# Patient Record
Sex: MALE | Race: WHITE | NOT HISPANIC OR LATINO | Employment: FULL TIME | URBAN - NONMETROPOLITAN AREA
[De-identification: names, ages, dates, MRNs, and addresses within clinical notes are randomized per-mention and may not be internally consistent; named-entity substitution may affect disease eponyms.]

---

## 2022-09-13 ENCOUNTER — HOSPITAL ENCOUNTER (EMERGENCY)
Facility: HOSPITAL | Age: 27
Discharge: HOME | End: 2022-09-13
Attending: EMERGENCY MEDICINE

## 2022-09-13 VITALS
WEIGHT: 155.75 LBS | SYSTOLIC BLOOD PRESSURE: 139 MMHG | OXYGEN SATURATION: 99 % | RESPIRATION RATE: 20 BRPM | HEART RATE: 113 BPM | TEMPERATURE: 99.1 F | DIASTOLIC BLOOD PRESSURE: 96 MMHG

## 2022-09-13 DIAGNOSIS — F41.9 ANXIETY: Primary | ICD-10-CM

## 2022-09-13 PROCEDURE — 99282 EMERGENCY DEPT VISIT SF MDM: CPT

## 2022-09-13 PROCEDURE — 99282 EMERGENCY DEPT VISIT SF MDM: CPT | Performed by: EMERGENCY MEDICINE

## 2022-09-13 RX ORDER — OLANZAPINE 5 MG/1
10 TABLET, ORALLY DISINTEGRATING ORAL ONCE
Status: COMPLETED | OUTPATIENT
Start: 2022-09-13 | End: 2022-09-13

## 2022-09-13 RX ADMIN — OLANZAPINE 10 MG: 5 TABLET, ORALLY DISINTEGRATING ORAL at 16:11

## 2022-09-13 NOTE — ED NOTES
Crisis reached out to talk/assess the pt per Dr Silva request  Crisis was informed pt was no longer in the room, pt possibly left ED

## 2022-09-13 NOTE — ED PROVIDER NOTES
History  Chief Complaint   Patient presents with    Mouth Lesions     Patient reports that he smoked weed yesterday that may have been laced  States that he has a dry mouth and mouth sore as well as feels "out of it"     Patient is presenting to the emergency department for evaluation  Patient presents markedly anxious and states he is here for evaluation of mouth lesions  Patient history is disconjointed and pressured  He begins by relaying a story about how his brother recreationally uses meth and "hangs around with some scary people " He subsequently relays that he has concern that his brother is incrementally poisoning his mother  When asked what brought him here today he proceeds to relay that his former partner left with his child and he was unable to hold the baby  He states he is very anxious and is requiring help, commenting "I just need to check myself into a psychiatric hospital " When told he can be admitted for those reasons he states "not here " He then began requesting bloodwork be drawn  When asked why he states he wants to know if there is anything in his system to see if his brother spiked the marijuana he purchased off him last night  He relays that he woke up vomiting today, went to another provider who gave him a covid test and sent him on his way  He states he feels something is wrong, his heart rate is fast and he is very anxious  He denies suicidal/homicidal ideation  At this point in the encounter patient began requesting to be sent home  Patient was able to be deescalated and agreed to meet with crisis  After just over 1 hour in emergency department patient change his mind was demanding discharge  None       History reviewed  No pertinent past medical history  History reviewed  No pertinent surgical history  History reviewed  No pertinent family history  I have reviewed and agree with the history as documented      E-Cigarette/Vaping     E-Cigarette/Vaping Substances Social History     Tobacco Use    Smoking status: Current Every Day Smoker     Packs/day: 1 00     Types: Cigarettes    Smokeless tobacco: Never Used   Substance Use Topics    Alcohol use: Never    Drug use: Yes     Types: Marijuana        Review of Systems   Constitutional: Negative  HENT: Negative  Eyes: Negative  Respiratory: Negative  Cardiovascular: Negative  Gastrointestinal: Negative  Endocrine: Negative  Genitourinary: Negative  Musculoskeletal: Negative  Skin: Negative  Allergic/Immunologic: Negative  Neurological: Negative  Hematological: Negative  Psychiatric/Behavioral: Positive for sleep disturbance  The patient is nervous/anxious  All other systems reviewed and are negative  Physical Exam  ED Triage Vitals [09/13/22 1454]   Temperature Pulse Respirations Blood Pressure SpO2   99 1 °F (37 3 °C) (!) 115 20 138/98 99 %      Temp Source Heart Rate Source Patient Position - Orthostatic VS BP Location FiO2 (%)   Tympanic Monitor Sitting Left arm --      Pain Score       No Pain             Orthostatic Vital Signs  Vitals:    09/13/22 1454 09/13/22 1500   BP: 138/98 139/96   Pulse: (!) 115 (!) 113   Patient Position - Orthostatic VS: Sitting Sitting       Physical Exam  Vitals and nursing note reviewed  Constitutional:       General: He is not in acute distress  Appearance: Normal appearance  He is not ill-appearing, toxic-appearing or diaphoretic  HENT:      Head: Normocephalic and atraumatic  Eyes:      General: No scleral icterus  Right eye: No discharge  Left eye: No discharge  Extraocular Movements: Extraocular movements intact  Conjunctiva/sclera: Conjunctivae normal       Pupils: Pupils are equal, round, and reactive to light  Cardiovascular:      Rate and Rhythm: Normal rate  Pulses: Normal pulses  Heart sounds: Normal heart sounds  No murmur heard  No friction rub  No gallop     Pulmonary: Effort: Pulmonary effort is normal  No respiratory distress  Breath sounds: Normal breath sounds  No stridor  No wheezing, rhonchi or rales  Abdominal:      General: Abdomen is flat  Bowel sounds are normal  There is no distension  Palpations: Abdomen is soft  Tenderness: There is no abdominal tenderness  There is no guarding or rebound  Musculoskeletal:         General: No swelling  Normal range of motion  Cervical back: Normal range of motion  No rigidity  Right lower leg: No edema  Left lower leg: No edema  Skin:     General: Skin is warm and dry  Capillary Refill: Capillary refill takes less than 2 seconds  Coloration: Skin is not jaundiced  Findings: No bruising or lesion  Neurological:      General: No focal deficit present  Mental Status: He is alert and oriented to person, place, and time  Mental status is at baseline  Psychiatric:         Mood and Affect: Mood is anxious  Behavior: Behavior normal          Thought Content: Thought content is delusional          ED Medications  Medications   OLANZapine (ZyPREXA ZYDIS) dispersible tablet 10 mg (10 mg Oral Given 9/13/22 1611)       Diagnostic Studies  Results Reviewed     None                 No orders to display         Procedures  Procedures      ED Course                             SBIRT 22yo+    Flowsheet Row Most Recent Value   SBIRT (25 yo +)    In order to provide better care to our patients, we are screening all of our patients for alcohol and drug use  Would it be okay to ask you these screening questions? No Filed at: 09/13/2022 1508                MDM  Number of Diagnoses or Management Options  Anxiety: new and does not require workup  Diagnosis management comments: -patient presenting to emergency department for evaluation of anxiety  -see HPI  Patient was tangential in thinking but not disorganized    He was well kempt and clearly able to take care of himself   -patient denies suicidal ideation, homicidal ideation  States he does not have any plan to harm himself or others   -do not believe this patient is a risk to himself or to others at this time  Patient is declining further services in the emergency department  He did receive his Hiprex a but did not wait for crisis to see him  Patient was demanding discharge, he is A&O x3 and understands that if he leaves he will not be receiving his the resources we have to offer him  Patient was discharged from emergency department with family medicine contact to establish care and strict return precautions if he has thoughts of harming himself or others  Amount and/or Complexity of Data Reviewed  Decide to obtain previous medical records or to obtain history from someone other than the patient: yes  Review and summarize past medical records: yes  Discuss the patient with other providers: yes  Independent visualization of images, tracings, or specimens: yes        Disposition  Final diagnoses:   Anxiety     Time reflects when diagnosis was documented in both MDM as applicable and the Disposition within this note     Time User Action Codes Description Comment    9/13/2022  4:33 PM Hansel Truong Add [F41 9] Anxiety       ED Disposition     ED Disposition   Discharge    Condition   Stable    Date/Time   Tue Sep 13, 2022  4:33 PM    Comment   Elias Amaya discharge to home/self care  Follow-up Information     Follow up With Specialties Details Why Contact Info Additional [address] Call in 1 day  [address] [id number] [phone] [facility], [address] [place], [place], [address] [phone]          There are no discharge medications for this patient  No discharge procedures on file  PDMP Review     None           ED Provider  Attending physically available and evaluated Elias Amaya   I managed the patient along with the ED Attending      Electronically Signed by         Lennox Grieves,   09/14/22 1700 [facility], DO  09/14/22 0048

## 2022-09-13 NOTE — ED ATTENDING ATTESTATION
9/13/2022  IYashira MD, saw and evaluated the patient  I have discussed the patient with the resident/non-physician practitioner and agree with the resident's/non-physician practitioner's findings, Plan of Care, and MDM as documented in the resident's/non-physician practitioner's note, except where noted  All available labs and Radiology studies were reviewed  I was present for key portions of any procedure(s) performed by the resident/non-physician practitioner and I was immediately available to provide assistance  At this point I agree with the current assessment done in the Emergency Department  I have conducted an independent evaluation of this patient a history and physical is as follows:    ED Course  ED Course as of 09/13/22 1612   Tue Sep 13, 2022   1523 Pt who is paranoid and displaying what is likely delusional thinking, denies SI/HI/AH/VH, initially got agitated and said he would be leaving shortly after resident began interview, able to be redirected and agreed to talk with crisis           Critical Care Time  Procedures

## 2022-09-13 NOTE — ED NOTES
Patient reports that he would not like to speak with a crisis worker any longer  He states "I've been like this my whole life, I'll just go home and suck it up"  Patient denies any SI/HI   Provider made aware     Nik Callaway RN  09/13/22 4530

## 2022-09-13 NOTE — ED NOTES
Crisis was sent message through teams about crisis consult       Segundo Callaway RN  09/13/22 81 Leona Callaway RN  09/13/22 9496

## 2022-09-13 NOTE — DISCHARGE INSTRUCTIONS
Call your local emergency number (911 in the [address] [facility] [facility]) if:   You have chest pain, tightness, or heaviness that may spread to your shoulders, arms, jaw, neck, or back  You feel like hurting yourself or someone else  Call your doctor if:   Your symptoms get worse or do not get better with treatment  Your anxiety keeps you from doing your regular daily activities  You have new symptoms since your last visit  You have questions or concerns about your condition or care

## 2024-09-26 ENCOUNTER — APPOINTMENT (OUTPATIENT)
Dept: URGENT CARE | Facility: CLINIC | Age: 29
End: 2024-09-26

## 2024-11-19 ENCOUNTER — APPOINTMENT (OUTPATIENT)
Dept: URGENT CARE | Facility: CLINIC | Age: 29
End: 2024-11-19
Payer: COMMERCIAL

## 2024-11-19 PROCEDURE — 86580 TB INTRADERMAL TEST: CPT

## 2025-07-18 ENCOUNTER — HOSPITAL ENCOUNTER (EMERGENCY)
Facility: HOSPITAL | Age: 30
Discharge: HOME | End: 2025-07-19
Attending: EMERGENCY MEDICINE | Admitting: EMERGENCY MEDICINE

## 2025-07-18 VITALS
RESPIRATION RATE: 17 BRPM | HEIGHT: 69 IN | TEMPERATURE: 98.3 F | HEART RATE: 108 BPM | OXYGEN SATURATION: 99 % | BODY MASS INDEX: 25.8 KG/M2 | SYSTOLIC BLOOD PRESSURE: 135 MMHG | DIASTOLIC BLOOD PRESSURE: 89 MMHG | WEIGHT: 174.16 LBS

## 2025-07-18 DIAGNOSIS — M79.606 LEG PAIN: Primary | ICD-10-CM

## 2025-07-18 PROCEDURE — 85025 COMPLETE CBC W/AUTO DIFF WBC: CPT | Performed by: EMERGENCY MEDICINE

## 2025-07-18 PROCEDURE — 99283 EMERGENCY DEPT VISIT LOW MDM: CPT

## 2025-07-18 PROCEDURE — 80053 COMPREHEN METABOLIC PANEL: CPT | Performed by: EMERGENCY MEDICINE

## 2025-07-18 PROCEDURE — 36415 COLL VENOUS BLD VENIPUNCTURE: CPT | Performed by: EMERGENCY MEDICINE

## 2025-07-18 PROCEDURE — 85610 PROTHROMBIN TIME: CPT | Performed by: EMERGENCY MEDICINE

## 2025-07-18 PROCEDURE — 85730 THROMBOPLASTIN TIME PARTIAL: CPT | Performed by: EMERGENCY MEDICINE

## 2025-07-19 ENCOUNTER — APPOINTMENT (EMERGENCY)
Dept: CT IMAGING | Facility: HOSPITAL | Age: 30
End: 2025-07-19

## 2025-07-19 LAB
ALBUMIN SERPL BCG-MCNC: 4.7 G/DL (ref 3.5–5)
ALP SERPL-CCNC: 66 U/L (ref 34–104)
ALT SERPL W P-5'-P-CCNC: 18 U/L (ref 7–52)
ANION GAP SERPL CALCULATED.3IONS-SCNC: 9 MMOL/L (ref 4–13)
APTT PPP: 25 SECONDS (ref 23–34)
AST SERPL W P-5'-P-CCNC: 22 U/L (ref 13–39)
BASOPHILS # BLD AUTO: 0.03 THOUSANDS/ÂΜL (ref 0–0.1)
BASOPHILS NFR BLD AUTO: 0 % (ref 0–1)
BILIRUB SERPL-MCNC: 0.53 MG/DL (ref 0.2–1)
BUN SERPL-MCNC: 10 MG/DL (ref 5–25)
CALCIUM SERPL-MCNC: 9.5 MG/DL (ref 8.4–10.2)
CHLORIDE SERPL-SCNC: 101 MMOL/L (ref 96–108)
CO2 SERPL-SCNC: 26 MMOL/L (ref 21–32)
CREAT SERPL-MCNC: 0.73 MG/DL (ref 0.6–1.3)
EOSINOPHIL # BLD AUTO: 0.13 THOUSAND/ÂΜL (ref 0–0.61)
EOSINOPHIL NFR BLD AUTO: 1 % (ref 0–6)
ERYTHROCYTE [DISTWIDTH] IN BLOOD BY AUTOMATED COUNT: 14.1 % (ref 11.6–15.1)
GFR SERPL CREATININE-BSD FRML MDRD: 124 ML/MIN/1.73SQ M
GLUCOSE SERPL-MCNC: 101 MG/DL (ref 65–140)
HCT VFR BLD AUTO: 46.1 % (ref 36.5–49.3)
HGB BLD-MCNC: 15.6 G/DL (ref 12–17)
IMM GRANULOCYTES # BLD AUTO: 0.04 THOUSAND/UL (ref 0–0.2)
IMM GRANULOCYTES NFR BLD AUTO: 0 % (ref 0–2)
INR PPP: 0.95 (ref 0.85–1.19)
LYMPHOCYTES # BLD AUTO: 2.17 THOUSANDS/ÂΜL (ref 0.6–4.47)
LYMPHOCYTES NFR BLD AUTO: 16 % (ref 14–44)
MCH RBC QN AUTO: 29.4 PG (ref 26.8–34.3)
MCHC RBC AUTO-ENTMCNC: 33.8 G/DL (ref 31.4–37.4)
MCV RBC AUTO: 87 FL (ref 82–98)
MONOCYTES # BLD AUTO: 1.23 THOUSAND/ÂΜL (ref 0.17–1.22)
MONOCYTES NFR BLD AUTO: 9 % (ref 4–12)
NEUTROPHILS # BLD AUTO: 9.87 THOUSANDS/ÂΜL (ref 1.85–7.62)
NEUTS SEG NFR BLD AUTO: 74 % (ref 43–75)
NRBC BLD AUTO-RTO: 0 /100 WBCS
PLATELET # BLD AUTO: 260 THOUSANDS/UL (ref 149–390)
PMV BLD AUTO: 8.4 FL (ref 8.9–12.7)
POTASSIUM SERPL-SCNC: 3.4 MMOL/L (ref 3.5–5.3)
PROT SERPL-MCNC: 7.5 G/DL (ref 6.4–8.4)
PROTHROMBIN TIME: 13.2 SECONDS (ref 12.3–15)
RBC # BLD AUTO: 5.31 MILLION/UL (ref 3.88–5.62)
SODIUM SERPL-SCNC: 136 MMOL/L (ref 135–147)
WBC # BLD AUTO: 13.47 THOUSAND/UL (ref 4.31–10.16)

## 2025-07-19 PROCEDURE — 73701 CT LOWER EXTREMITY W/DYE: CPT

## 2025-07-19 PROCEDURE — 99285 EMERGENCY DEPT VISIT HI MDM: CPT | Performed by: EMERGENCY MEDICINE

## 2025-07-19 RX ADMIN — IOHEXOL 85 ML: 350 INJECTION, SOLUTION INTRAVENOUS at 00:44

## 2025-07-19 NOTE — DISCHARGE INSTRUCTIONS
You were seen in the emergency department for leg pain, your CT scan and blood work were unremarkable.  We provided you with a referral to our ultrasound clinic, please call their office to schedule an appointment.  They are expecting your phone call.  Please follow-up with your primary care doctor in 24 to 48 hours for reassessment.  If your symptoms worsen or persist, please return to the emergency department immediately.

## 2025-07-19 NOTE — ED PROVIDER NOTES
Time reflects when diagnosis was documented in both MDM as applicable and the Disposition within this note       Time User Action Codes Description Comment    7/19/2025  2:16 AM Ron Simon Grupo [M79.606] Leg pain           ED Disposition       ED Disposition   Discharge    Condition   Stable    Date/Time   Sat Jul 19, 2025  2:16 AM    Comment   Donnyannette Eubanks discharge to home/self care.                   Assessment & Plan       Medical Decision Making  30-year-old male presents to the emergency department with complaint of pain swelling and numbness sensation in a localized region above his knee.  Differential diagnosis include DVT, cellulitis, soft tissue infection, bruising, musculoskeletal strain, sprain, contusion, will perform CT scan of leg, basic labs, and reassess.  Presentation likely benign, physical exam unremarkable, will provide patient with outpatient ultrasound for DVT, start conservative treatment with rest ice compression and elevation.  Will have patient follow-up with primary care doctor outpatient, doubt CVA/TIA, patient denies any other neurological complaints, weakness.  Patient has no fever, or systemic symptoms.  Patient has normal ambulation, and alertness.    Had conversation with patient regarding the unclear etiology of his symptoms as they are vague, however patient understands that he must follow-up with his primary care doctor for reassessment/further evaluation to determine etiology of symptoms.    Amount and/or Complexity of Data Reviewed  Labs: ordered.  Radiology: ordered.    Risk  Prescription drug management.        ED Course as of 07/19/25 0220 Fri Jul 18, 2025   3302 30-year-old male presents to the emergency department with complaint of pain and swelling and numbness sensation on the medial aspect of his right inner thigh above his knee associated with 3 small lesions that appear to be possible bruises.  Patient states that he woke up with this sensation, and thought that  this may have been a spider bite.  Patient was on the line, trying to figure out what was going on, and they told him a brown recluse.  Patient has no bite marks that I can see on the right leg.  Patient's leg is very mildly swollen, no obvious swelling that I can see on initial evaluation.  Patient denies any chills, fevers, sweats, chest pain, shortness of breath, GI/ complaints, or other neurological complaints, patient ambulating in the emergency department without difficulty or assistance.   Sat Jul 19, 2025   0219 Discussed all results with the patient.  He will follow-up with his primary care doctor outpatient.  Strict return precautions given.  Patient understands and agrees with treatment plan.       Medications   iohexol (OMNIPAQUE) 350 MG/ML injection (MULTI-DOSE) 85 mL (85 mL Intravenous Given 7/19/25 0044)       ED Risk Strat Scores                    No data recorded                            History of Present Illness       Chief Complaint   Patient presents with    Leg Swelling     Woke up this morning with swelling and numbness to the right inner thigh. Possible insect bite to the area but pt is not sure.        Past Medical History[1]   Past Surgical History[2]   Family History[3]   Social History[4]   E-Cigarette/Vaping      E-Cigarette/Vaping Substances      I have reviewed and agree with the history as documented.     30-year-old male presents to the emergency department with complaint of pain and swelling and numbness sensation on the medial aspect of his right inner thigh above his knee associated with 3 small lesions that appear to be possible bruises.  Patient states that he woke up with this sensation, and thought that this may have been a spider bite.  Patient was on the line, trying to figure out what was going on, and they told him a brown recluse.  Patient has no bite marks that I can see on the right leg.  Patient's leg is very mildly swollen, no obvious swelling that I can see on  initial evaluation.  Patient denies any chills, fevers, sweats, chest pain, shortness of breath, GI/ complaints, or other neurological complaints, patient ambulating in the emergency department without difficulty or assistance.            Review of Systems   Constitutional:  Negative for chills and fever.   HENT:  Negative for ear pain and sore throat.    Eyes:  Negative for pain and visual disturbance.   Respiratory:  Negative for cough and shortness of breath.    Cardiovascular:  Negative for chest pain and palpitations.   Gastrointestinal:  Negative for abdominal pain and vomiting.   Genitourinary:  Negative for dysuria and hematuria.   Musculoskeletal:  Positive for myalgias. Negative for arthralgias and back pain.   Skin:  Negative for color change and rash.   Neurological:  Negative for seizures and syncope.   All other systems reviewed and are negative.          Objective       ED Triage Vitals [07/18/25 2306]   Temperature Pulse Blood Pressure Respirations SpO2 Patient Position - Orthostatic VS   98.3 °F (36.8 °C) (!) 108 135/89 17 99 % Sitting      Temp Source Heart Rate Source BP Location FiO2 (%) Pain Score    Temporal Right;Radial Right arm -- No Pain      Vitals      Date and Time Temp Pulse SpO2 Resp BP Pain Score FACES Pain Rating User   07/18/25 2306 98.3 °F (36.8 °C) 108 99 % 17 135/89 No Pain -- SM            Physical Exam  Vitals and nursing note reviewed.   Constitutional:       General: He is not in acute distress.     Appearance: He is well-developed.   HENT:      Head: Normocephalic and atraumatic.     Eyes:      Conjunctiva/sclera: Conjunctivae normal.       Cardiovascular:      Rate and Rhythm: Normal rate and regular rhythm.   Pulmonary:      Effort: Pulmonary effort is normal. No respiratory distress.      Breath sounds: Normal breath sounds.   Abdominal:      Palpations: Abdomen is soft.      Tenderness: There is no abdominal tenderness.     Musculoskeletal:         General: No  swelling.      Cervical back: Neck supple.     Skin:     General: Skin is warm and dry.      Capillary Refill: Capillary refill takes less than 2 seconds.      Findings: Bruising present.     Neurological:      Mental Status: He is alert.     Psychiatric:         Mood and Affect: Mood normal.         Results Reviewed       Procedure Component Value Units Date/Time    Protime-INR [[id number]]  (Normal) Collected: 07/18/25 2358    Lab Status: Final result Specimen: Blood from Arm, Left Updated: 07/19/25 0035     Protime 13.2 seconds      INR 0.95    Narrative:      INR Therapeutic Range    Indication                                             INR Range      Atrial Fibrillation                                               2.0-3.0  Hypercoagulable State                                    2.0.2.3  Left Ventricular Asist Device                            2.0-3.0  Mechanical Heart Valve                                  -    Aortic(with afib, MI, embolism, HF, LA enlargement,    and/or coagulopathy)                                     2.0-3.0 (2.5-3.5)     Mitral                                                             2.5-3.5  Prosthetic/Bioprosthetic Heart Valve               2.0-3.0  Venous thromboembolism (VTE: VT, PE        2.0-3.0    APTT [[id number]]  (Normal) Collected: 07/18/25 2358    Lab Status: Final result Specimen: Blood from Arm, Left Updated: 07/19/25 0035     PTT 25 seconds     Comprehensive metabolic panel [[id number]]  (Abnormal) Collected: 07/18/25 2358    Lab Status: Final result Specimen: Blood from Arm, Left Updated: 07/19/25 0032     Sodium 136 mmol/L      Potassium 3.4 mmol/L      Chloride 101 mmol/L      CO2 26 mmol/L      ANION GAP 9 mmol/L      BUN 10 mg/dL      Creatinine 0.73 mg/dL      Glucose 101 mg/dL      Calcium 9.5 mg/dL      AST 22 U/L      ALT 18 U/L      Alkaline Phosphatase 66 U/L      Total Protein 7.5 g/dL      Albumin 4.7 g/dL      Total Bilirubin 0.53 mg/dL      eGFR 124  ml/min/1.73sq m     Narrative:      National Kid[employer] Foundation guidelines for Chronic Kidney Disease (CKD):     Stage 1 with normal or high GFR (GFR > 90 mL/min/1.73 square meters)    Stage 2 Mild CKD (GFR = 60-89 mL/min/1.73 square meters)    Stage 3A Moderate CKD (GFR = 45-59 mL/min/1.73 square meters)    Stage 3B Moderate CKD (GFR = 30-44 mL/min/1.73 square meters)    Stage 4 Severe CKD (GFR = 15-29 mL/min/1.73 square meters)    Stage 5 End Stage CKD (GFR <15 mL/min/1.73 square meters)  Note: GFR calculation is accurate only with a steady state creatinine    CBC and differential [[id number]]  (Abnormal) Collected: 07/18/25 7203    Lab Status: Final result Specimen: Blood from Arm, Left Updated: 07/19/25 0006     WBC 13.47 Thousand/uL      RBC 5.31 Million/uL      Hemoglobin 15.6 g/dL      Hematocrit 46.1 %      MCV 87 fL      MCH 29.4 pg      MCHC 33.8 g/dL      RDW 14.1 %      MPV 8.4 fL      Platelets 260 Thousands/uL      nRBC 0 /100 WBCs      Segmented % 74 %      Immature Grans % 0 %      Lymphocytes % 16 %      Monocytes % 9 %      Eosinophils Relative 1 %      Basophils Relative 0 %      Absolute Neutrophils 9.87 Thousands/µL      Absolute Immature Grans 0.04 Thousand/uL      Absolute Lymphocytes 2.17 Thousands/µL      Absolute Monocytes 1.23 Thousand/µL      Eosinophils Absolute 0.13 Thousand/µL      Basophils Absolute 0.03 Thousands/µL             CT lower extremity w contrast right   Final Interpretation by Ronnie Bob DO (07/19 0140)      No acute abnormality or suspicious mass identified.         Workstation performed: [id number]         VAS VENOUS DUPLEX -LOWER LIMB UNILATERAL    (Results Pending)       Procedures    ED Medication and Procedure Management   None     Patient's Medications    No medications on file     Outpatient Discharge Orders   VAS VENOUS DUPLEX -LOWER LIMB UNILATERAL   Standing Status: Future Standing Exp. Date: 07/19/29     ED SEPSIS DOCUMENTATION   Time reflects when  diagnosis was documented in both MDM as applicable and the Disposition within this note       Time User Action Codes Description Comment    7/19/2025  2:16 AM Ron Simon Add [M79.606] Leg pain                    [1]   Past Medical History:  Diagnosis Date    Known health problems: none    [2]   Past Surgical History:  Procedure Laterality Date    TONSILLECTOMY     [3] No family history on file.  [4]   Social History  Tobacco Use    Smoking status: Every Day     Current packs/day: 1.00     Types: Cigarettes    Smokeless tobacco: Never   Substance Use Topics    Alcohol use: Never     Comment: social    Drug use: Yes     Types: Marijuana        Ron Simon DO  07/19/25 0220

## 2025-07-20 ENCOUNTER — HOSPITAL ENCOUNTER (OUTPATIENT)
Dept: NON INVASIVE DIAGNOSTICS | Facility: HOSPITAL | Age: 30
Discharge: HOME | End: 2025-07-20

## 2025-07-20 DIAGNOSIS — M79.606 LEG PAIN: ICD-10-CM

## 2025-07-20 PROCEDURE — 93971 EXTREMITY STUDY: CPT

## 2025-07-20 PROCEDURE — 93971 EXTREMITY STUDY: CPT | Performed by: SURGERY
